# Patient Record
Sex: MALE | Race: WHITE | Employment: FULL TIME | URBAN - METROPOLITAN AREA
[De-identification: names, ages, dates, MRNs, and addresses within clinical notes are randomized per-mention and may not be internally consistent; named-entity substitution may affect disease eponyms.]

---

## 2020-07-13 ENCOUNTER — HOSPITAL ENCOUNTER (EMERGENCY)
Age: 55
Discharge: HOME | End: 2020-07-13
Attending: EMERGENCY MEDICINE
Payer: COMMERCIAL

## 2020-07-13 ENCOUNTER — APPOINTMENT (OUTPATIENT)
Dept: CT IMAGING | Age: 55
End: 2020-07-13
Payer: COMMERCIAL

## 2020-07-13 VITALS
BODY MASS INDEX: 45.1 KG/M2 | TEMPERATURE: 98.2 F | DIASTOLIC BLOOD PRESSURE: 65 MMHG | RESPIRATION RATE: 18 BRPM | OXYGEN SATURATION: 98 % | WEIGHT: 315 LBS | SYSTOLIC BLOOD PRESSURE: 115 MMHG | HEART RATE: 78 BPM | HEIGHT: 70 IN

## 2020-07-13 LAB
ALBUMIN SERPL-MCNC: 4.2 G/DL (ref 3.5–5.2)
ALP BLD-CCNC: 53 U/L (ref 40–129)
ALT SERPL-CCNC: 56 U/L (ref 0–40)
ANION GAP SERPL CALCULATED.3IONS-SCNC: 11 MMOL/L (ref 7–16)
APTT: 33.1 SEC (ref 24.5–35.1)
AST SERPL-CCNC: 29 U/L (ref 0–39)
BASOPHILS ABSOLUTE: 0.05 E9/L (ref 0–0.2)
BASOPHILS RELATIVE PERCENT: 0.8 % (ref 0–2)
BILIRUB SERPL-MCNC: 1 MG/DL (ref 0–1.2)
BUN BLDV-MCNC: 21 MG/DL (ref 6–20)
CALCIUM SERPL-MCNC: 9.3 MG/DL (ref 8.6–10.2)
CHLORIDE BLD-SCNC: 99 MMOL/L (ref 98–107)
CO2: 25 MMOL/L (ref 22–29)
CREAT SERPL-MCNC: 1.2 MG/DL (ref 0.7–1.2)
EKG ATRIAL RATE: 67 BPM
EKG P AXIS: 52 DEGREES
EKG P-R INTERVAL: 136 MS
EKG Q-T INTERVAL: 428 MS
EKG QRS DURATION: 84 MS
EKG QTC CALCULATION (BAZETT): 452 MS
EKG R AXIS: 45 DEGREES
EKG T AXIS: 23 DEGREES
EKG VENTRICULAR RATE: 67 BPM
EOSINOPHILS ABSOLUTE: 0.11 E9/L (ref 0.05–0.5)
EOSINOPHILS RELATIVE PERCENT: 1.8 % (ref 0–6)
GFR AFRICAN AMERICAN: >60
GFR NON-AFRICAN AMERICAN: >60 ML/MIN/1.73
GLUCOSE BLD-MCNC: 289 MG/DL (ref 74–99)
HCT VFR BLD CALC: 44.5 % (ref 37–54)
HEMOGLOBIN: 15.6 G/DL (ref 12.5–16.5)
IMMATURE GRANULOCYTES #: 0.04 E9/L
IMMATURE GRANULOCYTES %: 0.7 % (ref 0–5)
INR BLD: 1.1
LYMPHOCYTES ABSOLUTE: 1.32 E9/L (ref 1.5–4)
LYMPHOCYTES RELATIVE PERCENT: 21.6 % (ref 20–42)
MCH RBC QN AUTO: 30.8 PG (ref 26–35)
MCHC RBC AUTO-ENTMCNC: 35.1 % (ref 32–34.5)
MCV RBC AUTO: 87.9 FL (ref 80–99.9)
MONOCYTES ABSOLUTE: 0.64 E9/L (ref 0.1–0.95)
MONOCYTES RELATIVE PERCENT: 10.5 % (ref 2–12)
NEUTROPHILS ABSOLUTE: 3.95 E9/L (ref 1.8–7.3)
NEUTROPHILS RELATIVE PERCENT: 64.6 % (ref 43–80)
PDW BLD-RTO: 12.2 FL (ref 11.5–15)
PLATELET # BLD: 160 E9/L (ref 130–450)
PMV BLD AUTO: 11.6 FL (ref 7–12)
POTASSIUM SERPL-SCNC: 4.1 MMOL/L (ref 3.5–5)
PROTHROMBIN TIME: 12.2 SEC (ref 9.3–12.4)
RBC # BLD: 5.06 E12/L (ref 3.8–5.8)
SODIUM BLD-SCNC: 135 MMOL/L (ref 132–146)
TOTAL PROTEIN: 7.4 G/DL (ref 6.4–8.3)
TROPONIN: <0.01 NG/ML (ref 0–0.03)
WBC # BLD: 6.1 E9/L (ref 4.5–11.5)

## 2020-07-13 PROCEDURE — 85610 PROTHROMBIN TIME: CPT

## 2020-07-13 PROCEDURE — 80053 COMPREHEN METABOLIC PANEL: CPT

## 2020-07-13 PROCEDURE — 99285 EMERGENCY DEPT VISIT HI MDM: CPT

## 2020-07-13 PROCEDURE — 85730 THROMBOPLASTIN TIME PARTIAL: CPT

## 2020-07-13 PROCEDURE — 93010 ELECTROCARDIOGRAM REPORT: CPT | Performed by: INTERNAL MEDICINE

## 2020-07-13 PROCEDURE — 6360000004 HC RX CONTRAST MEDICATION: Performed by: RADIOLOGY

## 2020-07-13 PROCEDURE — 93005 ELECTROCARDIOGRAM TRACING: CPT | Performed by: EMERGENCY MEDICINE

## 2020-07-13 PROCEDURE — 71260 CT THORAX DX C+: CPT

## 2020-07-13 PROCEDURE — 84484 ASSAY OF TROPONIN QUANT: CPT

## 2020-07-13 PROCEDURE — 36415 COLL VENOUS BLD VENIPUNCTURE: CPT

## 2020-07-13 PROCEDURE — 85025 COMPLETE CBC W/AUTO DIFF WBC: CPT

## 2020-07-13 RX ORDER — AMLODIPINE BESYLATE 5 MG/1
5 TABLET ORAL DAILY
COMMUNITY

## 2020-07-13 RX ORDER — HYDROCHLOROTHIAZIDE 12.5 MG/1
12.5 TABLET ORAL DAILY
COMMUNITY

## 2020-07-13 RX ORDER — ATORVASTATIN CALCIUM 40 MG/1
40 TABLET, FILM COATED ORAL DAILY
COMMUNITY

## 2020-07-13 RX ADMIN — IOPAMIDOL 90 ML: 755 INJECTION, SOLUTION INTRAVENOUS at 11:00

## 2020-07-13 ASSESSMENT — PAIN DESCRIPTION - FREQUENCY: FREQUENCY: CONTINUOUS

## 2020-07-13 ASSESSMENT — PAIN DESCRIPTION - ONSET: ONSET: SUDDEN

## 2020-07-13 ASSESSMENT — PAIN DESCRIPTION - LOCATION: LOCATION: CHEST

## 2020-07-13 ASSESSMENT — PAIN SCALES - GENERAL: PAINLEVEL_OUTOF10: 5

## 2020-07-13 ASSESSMENT — PAIN DESCRIPTION - ORIENTATION: ORIENTATION: LEFT

## 2020-07-13 ASSESSMENT — PAIN DESCRIPTION - DESCRIPTORS: DESCRIPTORS: HEAVINESS

## 2020-07-13 ASSESSMENT — PAIN DESCRIPTION - PAIN TYPE: TYPE: ACUTE PAIN

## 2020-07-13 ASSESSMENT — PAIN DESCRIPTION - PROGRESSION: CLINICAL_PROGRESSION: NOT CHANGED

## 2020-07-13 NOTE — ED NOTES
Patient resting quietly in bed. Nurse will continue to monitor patient.  Cardiac monitor on     Roman Diallo RN  07/13/20 [id number]

## 2020-07-13 NOTE — ED NOTES
Patient given discharge paperwork at this time. Patient also given scripts. Patient has no further questions at this time. Nurse provided education to patient. Patient is alert and oriented and VS are stable at this time.       Ryan Rodriguez RN  07/13/20 [id number]

## 2020-07-13 NOTE — ED PROVIDER NOTES
HPI:  7/13/20,   Time: 1:15 PM EDT         Tatyana Villarreal is a 63 y.o. male presenting to the ED for chest pain, beginning last night ago. The complaint has been intermittent, moderate in severity, and worsened by taking a deep breath. The patient states that he has no history of PE or DVT. He has no recent long car rides or plane rides. He said he had a stress test couple years ago which he states was normal.  He is a diabetic. There is a family history of heart problems as well. He also describes some pain in the right arm. ROS:   Pertinent positives and negatives are stated within HPI, all other systems reviewed and are negative.  --------------------------------------------- PAST HISTORY ---------------------------------------------  Past Medical History:  has a past medical history of Diabetes mellitus ([facility] [address] and Hypertension. Past Surgical History:  has a past surgical history that includes Tonsillectomy and Endoscopy, colon, diagnostic (3/30/16). Social History:  reports that he has never smoked. He has never used smokeless tobacco. He reports that he does not drink alcohol or use drugs. Family History: family history includes COPD in his mother; Cancer in his father; Heart Disease in his father. The patients home medications have been reviewed. Allergies: Patient has no known allergies.     -------------------------------------------------- RESULTS -------------------------------------------------  All laboratory and radiology results have been personally reviewed by myself   LABS:  Results for orders placed or performed during the hospital encounter of 07/13/20   Comprehensive Metabolic Panel   Result Value Ref Range    Sodium 135 132 - 146 mmol/L    Potassium 4.1 3.5 - 5.0 mmol/L    Chloride 99 98 - 107 mmol/L    CO2 25 22 - 29 mmol/L    Anion Gap 11 7 - 16 mmol/L    Glucose 289 (H) 74 - 99 mg/dL    BUN 21 (H) 6 - 20 mg/dL    CREATININE 1.2 0.7 - 1.2 mg/dL    GFR Non- American >60 >=60 mL/min/1.73    GFR African American >60     Calcium 9.3 8.6 - 10.2 mg/dL    Total Protein 7.4 6.4 - 8.3 g/dL    Alb 4.2 3.5 - 5.2 g/dL    Total Bilirubin 1.0 0.0 - 1.2 mg/dL    Alkaline Phosphatase 53 40 - 129 U/L    ALT 56 (H) 0 - 40 U/L    AST 29 0 - 39 U/L   CBC Auto Differential   Result Value Ref Range    WBC 6.1 4.5 - 11.5 E9/L    RBC 5.06 3.80 - 5.80 E12/L    Hemoglobin 15.6 12.5 - 16.5 g/dL    Hematocrit 44.5 37.0 - 54.0 %    MCV 87.9 80.0 - 99.9 fL    MCH 30.8 26.0 - 35.0 pg    MCHC 35.1 (H) 32.0 - 34.5 %    RDW 12.2 11.5 - 15.0 fL    Platelets 138 779 - 385 E9/L    MPV 11.6 7.0 - 12.0 fL    Neutrophils % 64.6 43.0 - 80.0 %    Immature Granulocytes % 0.7 0.0 - 5.0 %    Lymphocytes % 21.6 20.0 - 42.0 %    Monocytes % 10.5 2.0 - 12.0 %    Eosinophils % 1.8 0.0 - 6.0 %    Basophils % 0.8 0.0 - 2.0 %    Neutrophils Absolute 3.95 1.80 - 7.30 E9/L    Immature Granulocytes # 0.04 E9/L    Lymphocytes Absolute 1.32 (L) 1.50 - 4.00 E9/L    Monocytes Absolute 0.64 0.10 - 0.95 E9/L    Eosinophils Absolute 0.11 0.05 - 0.50 E9/L    Basophils Absolute 0.05 0.00 - 0.20 E9/L   APTT   Result Value Ref Range    aPTT 33.1 24.5 - 35.1 sec   Protime-INR   Result Value Ref Range    Protime 12.2 9.3 - 12.4 sec    INR 1.1    Troponin   Result Value Ref Range    Troponin <0.01 0.00 - 0.03 ng/mL   EKG 12 Lead   Result Value Ref Range    Ventricular Rate 67 BPM    Atrial Rate 67 BPM    P-R Interval 136 ms    QRS Duration 84 ms    Q-T Interval 428 ms    QTc Calculation (Bazett) 452 ms    P Axis 52 degrees    R Axis 45 degrees    T Axis 23 degrees       RADIOLOGY:  Interpreted by Radiologist.  CT CHEST PULMONARY EMBOLISM W CONTRAST   Final Result   No central pulmonary embolism, aortic dissection or pulmonary   infiltrates. There is minimal atelectasis in the lung bases.                 ------------------------- NURSING NOTES AND VITALS REVIEWED ---------------------------   The nursing notes within the ED encounter and vital signs as below have been reviewed. /65   Pulse 78   Temp 98.2 °F (36.8 °C) (Infrared)   Resp 18   Ht 5' 10\" (1.778 m)   Wt (!) 350 lb (158.8 kg)   SpO2 98%   BMI 50.22 kg/m²   Oxygen Saturation Interpretation: Normal      ---------------------------------------------------PHYSICAL EXAM--------------------------------------      Constitutional/General: Alert and oriented x3, well appearing, non toxic in NAD  Head: NC/AT  Eyes: PERRL, EOMI  Mouth: Oropharynx clear, handling secretions, no trismus  Neck: Supple, full ROM, no meningeal signs  Pulmonary: Lungs clear to auscultation bilaterally, no wheezes, rales, or rhonchi. Not in respiratory distress  Cardiovascular:  Regular rate and rhythm, no murmurs, gallops, or rubs. 2+ distal pulses  Abdomen: Soft, non tender, non distended,   Extremities: Moves all extremities x 4. Warm and well perfused  Skin: warm and dry without rash  Neurologic: GCS 15,  Psych: Normal Affect      ------------------------------ ED COURSE/MEDICAL DECISION MAKING----------------------  Medications   iopamidol (ISOVUE-370) 76 % injection 90 mL (90 mLs Intravenous Given 7/13/20 1100)         Medical Decision Making:   Patient was advised to be admitted for 23-hour observation to telemetry unit for further cardiac evaluation however he states that he would like to go home and follow-up with his PCP and cardiology as an outpatient he is having no pain at time of discharge. Again I advised him that he has several risk factors and even though his EKG did not show any acute MI and his troponin was normal it would benefit him to be admitted to telemetry unit however he states he was strongly prefer to go home but he will follow-up with his PCP and his cardiologist    Counseling: The emergency provider has spoken with the patient and discussed todays results, in addition to providing specific details for the plan of care and counseling regarding the diagnosis and prognosis.

## 2022-08-27 ENCOUNTER — HOSPITAL ENCOUNTER (EMERGENCY)
Age: 57
Discharge: HOME | End: 2022-08-27
Attending: EMERGENCY MEDICINE
Payer: COMMERCIAL

## 2022-08-27 VITALS
TEMPERATURE: 98.2 F | SYSTOLIC BLOOD PRESSURE: 126 MMHG | DIASTOLIC BLOOD PRESSURE: 75 MMHG | HEIGHT: 70 IN | WEIGHT: 315 LBS | BODY MASS INDEX: 45.1 KG/M2 | RESPIRATION RATE: 20 BRPM | OXYGEN SATURATION: 96 % | HEART RATE: 75 BPM

## 2022-08-27 DIAGNOSIS — S05.01XA ABRASION OF RIGHT CORNEA, INITIAL ENCOUNTER: Primary | ICD-10-CM

## 2022-08-27 PROCEDURE — 6360000002 HC RX W HCPCS: Performed by: EMERGENCY MEDICINE

## 2022-08-27 PROCEDURE — 6370000000 HC RX 637 (ALT 250 FOR IP): Performed by: EMERGENCY MEDICINE

## 2022-08-27 PROCEDURE — 90714 TD VACC NO PRESV 7 YRS+ IM: CPT | Performed by: EMERGENCY MEDICINE

## 2022-08-27 PROCEDURE — 90471 IMMUNIZATION ADMIN: CPT | Performed by: EMERGENCY MEDICINE

## 2022-08-27 PROCEDURE — 99284 EMERGENCY DEPT VISIT MOD MDM: CPT

## 2022-08-27 RX ORDER — TETRACAINE HYDROCHLORIDE 5 MG/ML
2 SOLUTION OPHTHALMIC ONCE
Status: COMPLETED | OUTPATIENT
Start: 2022-08-27 | End: 2022-08-27

## 2022-08-27 RX ORDER — TETANUS AND DIPHTHERIA TOXOIDS ADSORBED 2; 2 [LF]/.5ML; [LF]/.5ML
0.5 INJECTION INTRAMUSCULAR ONCE
Status: COMPLETED | OUTPATIENT
Start: 2022-08-27 | End: 2022-08-27

## 2022-08-27 RX ORDER — TOBRAMYCIN 3 MG/ML
2 SOLUTION/ DROPS OPHTHALMIC EVERY 4 HOURS
Qty: 1 EACH | Refills: 0 | Status: SHIPPED | OUTPATIENT
Start: 2022-08-27 | End: 2022-09-03

## 2022-08-27 RX ADMIN — TETRACAINE HYDROCHLORIDE 2 DROP: 5 SOLUTION OPHTHALMIC at 12:32

## 2022-08-27 RX ADMIN — FLUORESCEIN SODIUM 1 MG: 1 STRIP OPHTHALMIC at 12:32

## 2022-08-27 RX ADMIN — TETANUS AND DIPHTHERIA TOXOIDS ADSORBED 0.5 ML: 2; 2 INJECTION INTRAMUSCULAR at 12:32

## 2022-08-27 ASSESSMENT — PAIN DESCRIPTION - ORIENTATION: ORIENTATION: RIGHT

## 2022-08-27 ASSESSMENT — PAIN SCALES - GENERAL: PAINLEVEL_OUTOF10: 3

## 2022-08-27 ASSESSMENT — PAIN DESCRIPTION - LOCATION: LOCATION: EYE

## 2022-08-27 ASSESSMENT — PAIN DESCRIPTION - ONSET: ONSET: SUDDEN

## 2022-08-27 ASSESSMENT — PAIN - FUNCTIONAL ASSESSMENT: PAIN_FUNCTIONAL_ASSESSMENT: 0-10

## 2022-08-27 ASSESSMENT — VISUAL ACUITY
OU: 20/25
OS: 20/25
OD: 20/30

## 2022-08-27 ASSESSMENT — PAIN DESCRIPTION - DESCRIPTORS: DESCRIPTORS: DISCOMFORT

## 2022-08-27 ASSESSMENT — PAIN DESCRIPTION - FREQUENCY: FREQUENCY: CONTINUOUS

## 2022-08-27 ASSESSMENT — PAIN DESCRIPTION - PAIN TYPE: TYPE: ACUTE PAIN

## 2022-08-27 NOTE — ED PROVIDER NOTES
[address]  Department of Emergency Medicine   ED  Encounter Note  Admit Date/RoomTime: 2022 12:01 PM  ED Room:   NAME: Marialuisa Davila  : 1965  MRN: [id number]     Chief Complaint:  Foreign Body in Eye ([address] like something is in his right eye. [facility]ita [facility] )    HISTORY OF PRESENT ILLNESS        Marialuisa Davila is a 61 y.o. male who presents to the ED with a complaint of feeling like something is in his right eye. Patient states that his eyelashes sometimes curl in and he will get an eyelash in his eye. About 3 days ago he felt that sensation. States he has been wiping his eye. Pinching at his eyelids to pull the eyelashes out. States he just still has that sensation like something is in his eye. Right eyes very red, irritated. Tearing. Sensitive to light. Patient does not wear glasses. Does not wear contacts. Patient denies any other complaints. Rates the discomfort a 3 out of 10. Has been flushing his eye and using eyedrops, without relief. ROS   Pertinent positives and negatives are stated within HPI, all other systems reviewed and are negative. Past Medical History:  has a past medical history of Diabetes mellitus ([address] and Hypertension. Surgical History:  has a past surgical history that includes Tonsillectomy and Endoscopy, colon, diagnostic (3/30/16). Social History:  reports that he has never smoked. He has never used smokeless tobacco. He reports that he does not drink alcohol and does not use drugs. Family History: family history includes COPD in his mother; Cancer in his father; Heart Disease in his father. Allergies: Patient has no known allergies. PHYSICAL EXAM   Oxygen Saturation Interpretation: Normal on room air analysis.         ED Triage Vitals   BP Temp Temp Source Heart Rate Resp SpO2 Height Weight   22 1155 22 1155 22 1155 22 1155 22 1155 22 1155 22 1205 22 1205

## 2023-11-22 ENCOUNTER — APPOINTMENT (OUTPATIENT)
Dept: GENERAL RADIOLOGY | Age: 58
End: 2023-11-22
Payer: COMMERCIAL

## 2023-11-22 ENCOUNTER — HOSPITAL ENCOUNTER (EMERGENCY)
Age: 58
Discharge: HOME | End: 2023-11-22
Payer: COMMERCIAL

## 2023-11-22 VITALS
DIASTOLIC BLOOD PRESSURE: 83 MMHG | TEMPERATURE: 97.7 F | WEIGHT: 315 LBS | HEART RATE: 75 BPM | BODY MASS INDEX: 45.1 KG/M2 | OXYGEN SATURATION: 95 % | HEIGHT: 70 IN | SYSTOLIC BLOOD PRESSURE: 132 MMHG | RESPIRATION RATE: 16 BRPM

## 2023-11-22 DIAGNOSIS — M51.36 DDD (DEGENERATIVE DISC DISEASE), LUMBAR: ICD-10-CM

## 2023-11-22 DIAGNOSIS — M43.17 ANTEROLISTHESIS OF LUMBOSACRAL SPINE: Primary | ICD-10-CM

## 2023-11-22 PROCEDURE — 96372 THER/PROPH/DIAG INJ SC/IM: CPT

## 2023-11-22 PROCEDURE — 99284 EMERGENCY DEPT VISIT MOD MDM: CPT

## 2023-11-22 PROCEDURE — 6360000002 HC RX W HCPCS: Performed by: NURSE PRACTITIONER

## 2023-11-22 PROCEDURE — 72100 X-RAY EXAM L-S SPINE 2/3 VWS: CPT

## 2023-11-22 RX ORDER — CYCLOBENZAPRINE HCL 5 MG
5 TABLET ORAL 2 TIMES DAILY PRN
Qty: 10 TABLET | Refills: 0 | Status: SHIPPED | OUTPATIENT
Start: 2023-11-22 | End: 2023-12-02

## 2023-11-22 RX ORDER — LIDOCAINE 50 MG/G
1 PATCH TOPICAL DAILY
Qty: 10 PATCH | Refills: 0 | Status: SHIPPED | OUTPATIENT
Start: 2023-11-22 | End: 2023-12-02

## 2023-11-22 RX ORDER — SEMAGLUTIDE 1.34 MG/ML
INJECTION, SOLUTION SUBCUTANEOUS
COMMUNITY

## 2023-11-22 RX ORDER — ACETAMINOPHEN 500 MG
500 TABLET ORAL 4 TIMES DAILY PRN
Qty: 20 TABLET | Refills: 0 | Status: SHIPPED | OUTPATIENT
Start: 2023-11-22

## 2023-11-22 RX ORDER — KETOROLAC TROMETHAMINE 30 MG/ML
30 INJECTION, SOLUTION INTRAMUSCULAR; INTRAVENOUS ONCE
Status: COMPLETED | OUTPATIENT
Start: 2023-11-22 | End: 2023-11-22

## 2023-11-22 RX ORDER — DEXAMETHASONE SODIUM PHOSPHATE 10 MG/ML
6 INJECTION INTRAMUSCULAR; INTRAVENOUS ONCE
Status: COMPLETED | OUTPATIENT
Start: 2023-11-22 | End: 2023-11-22

## 2023-11-22 RX ADMIN — KETOROLAC TROMETHAMINE 30 MG: 30 INJECTION, SOLUTION INTRAMUSCULAR; INTRAVENOUS at 17:15

## 2023-11-22 RX ADMIN — DEXAMETHASONE SODIUM PHOSPHATE 6 MG: 10 INJECTION INTRAMUSCULAR; INTRAVENOUS at 17:16

## 2023-11-22 ASSESSMENT — PAIN DESCRIPTION - ORIENTATION
ORIENTATION: LOWER;RIGHT
ORIENTATION: RIGHT;LOWER

## 2023-11-22 ASSESSMENT — PAIN DESCRIPTION - LOCATION
LOCATION: BACK
LOCATION: BACK

## 2023-11-22 ASSESSMENT — PAIN - FUNCTIONAL ASSESSMENT
PAIN_FUNCTIONAL_ASSESSMENT: 0-10
PAIN_FUNCTIONAL_ASSESSMENT: PREVENTS OR INTERFERES SOME ACTIVE ACTIVITIES AND ADLS

## 2023-11-22 ASSESSMENT — PAIN DESCRIPTION - DESCRIPTORS: DESCRIPTORS: ACHING;DISCOMFORT;SHOOTING;THROBBING

## 2023-11-22 ASSESSMENT — LIFESTYLE VARIABLES: HOW OFTEN DO YOU HAVE A DRINK CONTAINING ALCOHOL: MONTHLY OR LESS

## 2023-11-22 ASSESSMENT — PAIN SCALES - GENERAL
PAINLEVEL_OUTOF10: 8
PAINLEVEL_OUTOF10: 5

## 2023-11-23 NOTE — ED PROVIDER NOTES
[address] ENCOUNTER        Pt Name: Casimiro Pope  MRN: [id number]  [facility] 1965  Date of evaluation: 11/22/2023  Provider: Rochele Nissen, APRN - CNP  PCP: Solomon Gomez MD  Note Started: 11:30 PM EST 11/22/23      BOLIVAR. I have evaluated this patient. CHIEF COMPLAINT       Chief Complaint   Patient presents with    Back Pain     Patient c/o low back pain right side radiating down right leg since this am       HISTORY OF PRESENT ILLNESS: 1 or more Elements     History from : Patient    Limitations to history : None    Casimiro Pope is a 60 y.o. male who presents department for low back pain. Patient states that he was bending over when he felt a pull in his low back. Patient states that he has a history of some intermittent low back pain but has not had pain in quite some time. Patient states that he has not had any loss of bowel or bladder function denies any abdominal pain nausea vomiting diarrhea. Patient states that he has taken over-the-counter medications for his symptoms which have not improved. Patient is concerned he might have sciatica. Nursing Notes were all reviewed and agreed with or any disagreements were addressed in the HPI. REVIEW OF SYSTEMS :      Review of Systems   All other systems reviewed and are negative. Positives and Pertinent negatives as per HPI.      SURGICAL HISTORY     Past Surgical History:   Procedure Laterality Date    ENDOSCOPY, COLON, DIAGNOSTIC  3/30/16    TONSILLECTOMY         CURRENTMEDICATIONS       Discharge Medication List as of 11/22/2023  5:56 PM        CONTINUE these medications which have NOT CHANGED    Details   metFORMIN (GLUCOPHAGE) 1000 MG tablet Take 1 tablet by mouth 2 times daily (with meals)Historical Med      Semaglutide,0.25 or 0.5MG/DOS, (OZEMPIC, 0.25 OR 0.5 MG/DOSE,) 2 MG/1.5ML SOPN Inject into the skinHistorical Med      Insulin Glargine w/

## 2024-02-28 ENCOUNTER — HOSPITAL ENCOUNTER (EMERGENCY)
Age: 59
Discharge: HOME | End: 2024-02-28
Attending: EMERGENCY MEDICINE
Payer: COMMERCIAL

## 2024-02-28 VITALS
OXYGEN SATURATION: 94 % | DIASTOLIC BLOOD PRESSURE: 89 MMHG | SYSTOLIC BLOOD PRESSURE: 129 MMHG | RESPIRATION RATE: 18 BRPM | TEMPERATURE: 99 F | HEART RATE: 99 BPM

## 2024-02-28 DIAGNOSIS — J06.9 UPPER RESPIRATORY TRACT INFECTION, UNSPECIFIED TYPE: Primary | ICD-10-CM

## 2024-02-28 DIAGNOSIS — H10.9 CONJUNCTIVITIS OF BOTH EYES, UNSPECIFIED CONJUNCTIVITIS TYPE: ICD-10-CM

## 2024-02-28 LAB
FLUAV RNA RESP QL NAA+PROBE: NOT DETECTED
FLUBV RNA RESP QL NAA+PROBE: NOT DETECTED
RSV BY PCR: NOT DETECTED
SARS-COV-2 RNA RESP QL NAA+PROBE: NOT DETECTED
SOURCE: NORMAL
SPECIMEN DESCRIPTION: NORMAL
SPECIMEN SOURCE: NORMAL

## 2024-02-28 PROCEDURE — 99283 EMERGENCY DEPT VISIT LOW MDM: CPT

## 2024-02-28 PROCEDURE — 87636 SARSCOV2 & INF A&B AMP PRB: CPT

## 2024-02-28 PROCEDURE — 87634 RSV DNA/RNA AMP PROBE: CPT

## 2024-02-28 PROCEDURE — 6370000000 HC RX 637 (ALT 250 FOR IP): Performed by: EMERGENCY MEDICINE

## 2024-02-28 RX ORDER — POLYMYXIN B SULFATE AND TRIMETHOPRIM 1; 10000 MG/ML; [USP'U]/ML
1 SOLUTION OPHTHALMIC EVERY 4 HOURS
Qty: 3 ML | Refills: 0 | Status: SHIPPED | OUTPATIENT
Start: 2024-02-28 | End: 2024-02-28

## 2024-02-28 RX ORDER — DOXYCYCLINE HYCLATE 100 MG
100 TABLET ORAL 2 TIMES DAILY
Qty: 20 TABLET | Refills: 0 | Status: SHIPPED | OUTPATIENT
Start: 2024-02-28 | End: 2024-03-09

## 2024-02-28 RX ORDER — DOXYCYCLINE HYCLATE 100 MG/1
100 CAPSULE ORAL ONCE
Status: COMPLETED | OUTPATIENT
Start: 2024-02-28 | End: 2024-02-28

## 2024-02-28 RX ORDER — GUAIFENESIN AND DEXTROMETHORPHAN HYDROBROMIDE 1200; 60 MG/1; MG/1
1 TABLET, EXTENDED RELEASE ORAL EVERY 12 HOURS PRN
Qty: 28 TABLET | Refills: 0 | Status: SHIPPED | OUTPATIENT
Start: 2024-02-28

## 2024-02-28 RX ORDER — FLUTICASONE PROPIONATE 50 MCG
1 SPRAY, SUSPENSION (ML) NASAL DAILY
Qty: 16 G | Refills: 0 | Status: SHIPPED | OUTPATIENT
Start: 2024-02-28 | End: 2024-02-28

## 2024-02-28 RX ORDER — FLUTICASONE PROPIONATE 50 MCG
1 SPRAY, SUSPENSION (ML) NASAL DAILY
Qty: 16 G | Refills: 0 | Status: SHIPPED | OUTPATIENT
Start: 2024-02-28

## 2024-02-28 RX ORDER — ACETAMINOPHEN 500 MG
1000 TABLET ORAL ONCE
Status: COMPLETED | OUTPATIENT
Start: 2024-02-28 | End: 2024-02-28

## 2024-02-28 RX ORDER — DOXYCYCLINE HYCLATE 100 MG
100 TABLET ORAL 2 TIMES DAILY
Qty: 20 TABLET | Refills: 0 | Status: SHIPPED | OUTPATIENT
Start: 2024-02-28 | End: 2024-02-28

## 2024-02-28 RX ORDER — GUAIFENESIN AND DEXTROMETHORPHAN HYDROBROMIDE 1200; 60 MG/1; MG/1
1 TABLET, EXTENDED RELEASE ORAL EVERY 12 HOURS PRN
Qty: 28 TABLET | Refills: 0 | Status: SHIPPED | OUTPATIENT
Start: 2024-02-28 | End: 2024-02-28

## 2024-02-28 RX ORDER — POLYMYXIN B SULFATE AND TRIMETHOPRIM 1; 10000 MG/ML; [USP'U]/ML
1 SOLUTION OPHTHALMIC EVERY 4 HOURS
Qty: 3 ML | Refills: 0 | Status: SHIPPED | OUTPATIENT
Start: 2024-02-28 | End: 2024-03-09

## 2024-02-28 RX ADMIN — DOXYCYCLINE HYCLATE 100 MG: 100 CAPSULE ORAL at 07:19

## 2024-02-28 RX ADMIN — ACETAMINOPHEN 1000 MG: 500 TABLET ORAL at 07:18

## 2024-02-28 ASSESSMENT — ENCOUNTER SYMPTOMS
WHEEZING: 0
DIARRHEA: 0
STRIDOR: 0
RHINORRHEA: 1
COUGH: 1
SINUS PAIN: 1
NAUSEA: 1
ABDOMINAL PAIN: 0
BACK PAIN: 0

## 2024-02-28 ASSESSMENT — LIFESTYLE VARIABLES
HOW MANY STANDARD DRINKS CONTAINING ALCOHOL DO YOU HAVE ON A TYPICAL DAY: PATIENT DOES NOT DRINK
HOW OFTEN DO YOU HAVE A DRINK CONTAINING ALCOHOL: NEVER

## 2024-02-28 ASSESSMENT — PAIN SCALES - GENERAL: PAINLEVEL_OUTOF10: 0

## 2024-02-28 NOTE — ED PROVIDER NOTES
Specimen: Nasopharyngeal Swab   Result Value Ref Range    Source .NASOPHARYNGEAL SWAB     RSV by PCR Not Detected Not Detected         When ordered only abnormal lab results are displayed. All other labs were within normal range or not returned as of this dictation.        RADIOLOGY:   Non-plain film images such as CT, Ultrasound and MRI are read by the radiologist. Plain radiographic images are visualized and preliminarily interpreted by the ED Provider with the below findings:        Interpretation per the Radiologist below, if available at the time of this note:    No orders to display     No results found.    No results found.    PROCEDURES   Unless otherwise noted below, none     Procedures    CRITICAL CARE TIME   none    PAST MEDICAL HISTORY      has a past medical history of Diabetes mellitus (HCC) and Hypertension.     EMERGENCY DEPARTMENT COURSE and DIFFERENTIAL DIAGNOSIS/MDM:   Vitals:    Vitals:    02/28/24 0641   BP: 129/89   Pulse: 99   Resp: 18   Temp: 99 °F (37.2 °C)   SpO2: 94%       Patient was given the following medications:  Medications   acetaminophen (TYLENOL) tablet 1,000 mg (1,000 mg Oral Given 2/28/24 0718)   doxycycline hyclate (VIBRAMYCIN) capsule 100 mg (100 mg Oral Given 2/28/24 0719)             [unfilled]    Chronic Conditions:   Active Ambulatory Problems     Diagnosis Date Noted    No Active Ambulatory Problems     Resolved Ambulatory Problems     Diagnosis Date Noted    No Resolved Ambulatory Problems     Past Medical History:   Diagnosis Date    Diabetes mellitus (HCC)     Hypertension          CONSULTS: (Who and What was discussed)  None    Discussion with Other Profesionals : None    Social Determinants : None    Records Reviewed : Other patient history and medications    CC/HPI Summary, DDx, ED Course, and Reassessment:   Patient is a 58-year-old male who presented for cough congestion bilateral eye drainage.  Differential diagnosis includes URI, viral conjunctivitis,

## 2024-02-28 NOTE — DISCHARGE INSTRUCTIONS
Call family doctor tomorrow and schedule a followup appointment to be seen in 2 days    Have your doctor obtain  finalized report of all testing    Take motrin or tylenol for fever or pain